# Patient Record
Sex: MALE | Race: WHITE | NOT HISPANIC OR LATINO | Employment: OTHER | URBAN - METROPOLITAN AREA
[De-identification: names, ages, dates, MRNs, and addresses within clinical notes are randomized per-mention and may not be internally consistent; named-entity substitution may affect disease eponyms.]

---

## 2023-12-07 ENCOUNTER — HOSPITAL ENCOUNTER (EMERGENCY)
Facility: CLINIC | Age: 48
Discharge: HOME | End: 2023-12-07
Attending: EMERGENCY MEDICINE | Admitting: EMERGENCY MEDICINE
Payer: COMMERCIAL

## 2023-12-07 VITALS
SYSTOLIC BLOOD PRESSURE: 182 MMHG | TEMPERATURE: 98.1 F | OXYGEN SATURATION: 99 % | RESPIRATION RATE: 20 BRPM | HEART RATE: 105 BPM | DIASTOLIC BLOOD PRESSURE: 118 MMHG

## 2023-12-07 DIAGNOSIS — S61.211A LACERATION OF LEFT INDEX FINGER WITHOUT FOREIGN BODY WITHOUT DAMAGE TO NAIL, INITIAL ENCOUNTER: ICD-10-CM

## 2023-12-07 PROCEDURE — 99283 EMERGENCY DEPT VISIT LOW MDM: CPT

## 2023-12-07 PROCEDURE — 12001 RPR S/N/AX/GEN/TRNK 2.5CM/<: CPT

## 2023-12-07 ASSESSMENT — ACTIVITIES OF DAILY LIVING (ADL): ADLS_ACUITY_SCORE: 33

## 2023-12-08 NOTE — ED PROVIDER NOTES
History     Chief Complaint:  Laceration       HPI   Brant Escalante is a 48 year old male patient presents emergency department with finger injury.  Reports a bottle fell and tried to catch it and it cut the tip of his left pointer finger.  Tetanus status up-to-date in 2022      Independent Historian:   None - Patient Only    Review of External Notes:          Medications:    No current outpatient medications on file.      Past Medical History:    No past medical history on file.    Past Surgical History:    No past surgical history on file.     Physical Exam   Patient Vitals for the past 24 hrs:   BP Temp Temp src Pulse Resp SpO2   12/07/23 2201 (!) 182/118 98.1  F (36.7  C) Oral 105 20 99 %        Physical Exam    Gen: Resting comfortably   Eyes: Clear conjunctiva, no discharge  Ears: External ears normal without swelling or drainage  Mouth: Mucous membranes moist  CV: regular rate  Resp: speaking in full sentences without any resp distress  Skin: Partial avulsed skin of the left pointer finger pad not involving the nail  Neuro: Alert, no gross motor or sensory deficits,   Psych: pleasant, affect appropriate      Emergency Department Course   ECG      Imaging:  No orders to display          Laboratory:  Labs Ordered and Resulted from Time of ED Arrival to Time of ED Departure - No data to display     Procedures     Laceration Repair      Procedure: Laceration Repair    Indication: Laceration    Consent: Verbal    Location: Left L second (index) finger    Length: 1 cm    Preparation: Irrigation with Sterile Saline.    Anesthesia/Sedation: Lidocaine - 1%      Treatment/Exploration: Wound explored, no foreign bodies found     Closure: The wound was closed with one layer. Skin/superficial layer was closed with 4 x 5-0 Nylon using Interrupted sutures.     Patient Status: The patient tolerated the procedure well: Yes. There were no complications.      Emergency Department Course & Assessments:              Interventions:  Medications - No data to display     Assessments:      Independent Interpretation (X-rays, CTs, rhythm strip):  None    Consultations/Discussion of Management or Tests:  None        Social Determinants of Health affecting care:   None    Disposition:  The patient was discharged to home.     Impression & Plan    CMS Diagnoses: None      Medical Decision Making:  Patient presents emergency department with an injury to his left pointer finger.  Partially avulsed flap of skin at the distal phalanx, no evidence of fracture.  No nail involvement.  4 sutures were placed, I did discuss with the patient the skin flap is fairly dusky in color.  Discussed how that may necrosis off.  If that happens a tissue can simply be cut away with the scissors and this could be done at his follow-up appointment 7 to 10 days to have the sutures removed.      Diagnosis:    ICD-10-CM    1. Laceration of left index finger without foreign body without damage to nail, initial encounter  S61.211A            Discharge Medications:  New Prescriptions    No medications on file          Hayden Marion MD  12/7/2023   Hayden Marion,*        Hayden Marion MD  12/07/23 2225

## (undated) RX ORDER — LIDOCAINE HYDROCHLORIDE 10 MG/ML
INJECTION, SOLUTION EPIDURAL; INFILTRATION; INTRACAUDAL; PERINEURAL
Status: DISPENSED
Start: 2023-12-07